# Patient Record
Sex: FEMALE | Race: WHITE | NOT HISPANIC OR LATINO | Employment: FULL TIME | URBAN - METROPOLITAN AREA
[De-identification: names, ages, dates, MRNs, and addresses within clinical notes are randomized per-mention and may not be internally consistent; named-entity substitution may affect disease eponyms.]

---

## 2019-10-21 ENCOUNTER — TELEPHONE (OUTPATIENT)
Dept: MATERNAL FETAL MEDICINE | Facility: CLINIC | Age: 29
End: 2019-10-21

## 2019-10-21 DIAGNOSIS — D26.7: Primary | ICD-10-CM

## 2019-10-21 DIAGNOSIS — O43.199: Primary | ICD-10-CM

## 2019-10-23 ENCOUNTER — OFFICE VISIT (OUTPATIENT)
Dept: MATERNAL FETAL MEDICINE | Facility: CLINIC | Age: 29
End: 2019-10-23
Payer: COMMERCIAL

## 2019-10-23 VITALS
SYSTOLIC BLOOD PRESSURE: 123 MMHG | BODY MASS INDEX: 23.7 KG/M2 | WEIGHT: 151 LBS | HEIGHT: 67 IN | DIASTOLIC BLOOD PRESSURE: 58 MMHG

## 2019-10-23 DIAGNOSIS — O43.199: ICD-10-CM

## 2019-10-23 DIAGNOSIS — Z92.89: ICD-10-CM

## 2019-10-23 DIAGNOSIS — D26.7 PLACENTA CHORIOANGIOMA IN SECOND TRIMESTER: ICD-10-CM

## 2019-10-23 DIAGNOSIS — Z36.89 ENCOUNTER FOR ULTRASOUND TO ASSESS FETAL GROWTH: Primary | ICD-10-CM

## 2019-10-23 DIAGNOSIS — D26.7: ICD-10-CM

## 2019-10-23 DIAGNOSIS — O43.192 PLACENTA CHORIOANGIOMA IN SECOND TRIMESTER: ICD-10-CM

## 2019-10-23 PROCEDURE — 3008F BODY MASS INDEX DOCD: CPT | Mod: CPTII,,, | Performed by: OBSTETRICS & GYNECOLOGY

## 2019-10-23 PROCEDURE — 76811 OB US DETAILED SNGL FETUS: CPT | Mod: ,,, | Performed by: OBSTETRICS & GYNECOLOGY

## 2019-10-23 PROCEDURE — 3008F PR BODY MASS INDEX (BMI) DOCUMENTED: ICD-10-PCS | Mod: CPTII,,, | Performed by: OBSTETRICS & GYNECOLOGY

## 2019-10-23 PROCEDURE — 99203 PR OFFICE/OUTPT VISIT, NEW, LEVL III, 30-44 MIN: ICD-10-PCS | Mod: 25,,, | Performed by: OBSTETRICS & GYNECOLOGY

## 2019-10-23 PROCEDURE — 76811 PR US, OB FETAL EVAL & EXAM, TRANSABDOM,FIRST GESTATION: ICD-10-PCS | Mod: ,,, | Performed by: OBSTETRICS & GYNECOLOGY

## 2019-10-23 PROCEDURE — 99203 OFFICE O/P NEW LOW 30 MIN: CPT | Mod: 25,,, | Performed by: OBSTETRICS & GYNECOLOGY

## 2019-10-23 RX ORDER — OMEPRAZOLE 20 MG/1
20 TABLET, DELAYED RELEASE ORAL DAILY
COMMUNITY

## 2019-10-23 RX ORDER — CITALOPRAM 20 MG/1
TABLET, FILM COATED ORAL
Refills: 5 | COMMUNITY
Start: 2019-10-16

## 2019-10-23 RX ORDER — ONDANSETRON HYDROCHLORIDE 8 MG/1
TABLET, FILM COATED ORAL
Refills: 2 | COMMUNITY
Start: 2019-10-03

## 2019-10-23 NOTE — LETTER
October 23, 2019      Shyam Juarez MD  [address] [place] [address] [place] [address]           [facility]  [address] [facility] , [address]  [place] [address]  Phone: [phone]          Patient: Kalli Osorio   MR Number: [id number]   YOB: 1990   Date of Visit: 10/23/2019       Dear Dr. Shyam Juarez:    Thank you for referring Kalli Osorio to me for evaluation. Attached you will find relevant portions of my assessment and plan of care.    If you have questions, please do not hesitate to call me. I look forward to following Kalli Osorio along with you.    Sincerely,    Leonardo Olson MD    Enclosure  CC:  No Recipients    If you would like to receive this communication electronically, please contact [email or web address] or [phone] to request more information on [facility] [employer] access.    For providers and/or their staff who would like to refer a patient to [facility], please contact us through our one-stop-shop provider referral line, [facility], at [phone].    If you feel you have received this communication in error or would no longer like to receive these types of communications, please e-mail [email or web address]

## 2019-10-23 NOTE — PROGRESS NOTES
Chief complaint: Placental tumor, history bowel intussesception    Provider requesting consultation: Dr. Camarillo    29 y.o. [id number] 22w1d EGA     PMH:  Past Medical History:   Diagnosis Date    Acid reflux     Hyperemesis gravidarum     Kidney stones     SAB (spontaneous )     Ulcer        PObHx:  OB History    Para Term  AB Living   3 1 1   1 1   SAB TAB Ectopic Multiple Live Births   1       1      # Outcome Date GA Lbr Henry/2nd Weight Sex Delivery Anes PTL Lv   3 Current            2 Term 11/10/17 40w0d  3.487 kg (7 lb 11 oz) F CS-LTranv   ANIBAL      Complications: Hyperemesis gravidarum   1 SAB 2016               PSH:  Past Surgical History:   Procedure Laterality Date    ADENOIDECTOMY      APPENDECTOMY      CHOLECYSTECTOMY      DILATION AND CURETTAGE OF UTERUS      KIDNEY STONE SURGERY      LITHOTRIPSY      TONSILLECTOMY         Family history:family history is not on file.    Social history: reports that she quit smoking about 3 years ago. Her smoking use included cigarettes. She has a 3.50 pack-year smoking history. She has never used smokeless tobacco. She reports that she drank alcohol. She reports that she does not use drugs.    A detailed fetal anatomical ultrasound was completed today.  [facility] details [facility] imaging section of [facility].    The patient is referred for the finding of what appears to be a tumor on the placenta. On today's U/S there is a inhomogeneous solid/cystic mass that is well circumscribed.  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  Chorangiomas range from microscopic lesions to large masses. Those greater than 4 to 5 cm in diameter carry a risk of fetal morbidity due to high output heart failure from arteriovenous shunting, platelet trapping (consumptive coagulopathy), and  delivery. Polyhydramnios is also common. On today's U/S the mass  measures 2.6 x 2.6 x 2.6 cm.  It is retroplacental in position.  It is possible that this represents a fibroid with degeneration.      In addition, note is made of the patient's past GI history.  She has had significant bowel problems with a non surgically treated intussusception.  Her symptoms are apparently made worse by the smooth muscle slowing effects of the progesterone secretion in pregnancy.  I am not in possession of any of these records at the time of this consult.  I will request records for her next visit.      I would like to see her back in 3 weeks to f/u tumor, anatomy and review records.     The patient was given an opportunity to ask questions about management and the diease process.  She expressed an understanding of and agreement to the above impression and plan. All questions were answered to her satisfaction.  She was given contact information to the [facility] clinic to address further concerns.      The approximate physician face-to-face time was 30 minutes. The majority of the time (>50%) was spent on counseling of the patient or coordination of care.

## 2019-11-12 ENCOUNTER — TELEPHONE (OUTPATIENT)
Dept: MATERNAL FETAL MEDICINE | Facility: CLINIC | Age: 29
End: 2019-11-12

## 2019-11-13 ENCOUNTER — PROCEDURE VISIT (OUTPATIENT)
Dept: MATERNAL FETAL MEDICINE | Facility: CLINIC | Age: 29
End: 2019-11-13
Payer: MEDICAID

## 2019-11-13 VITALS — DIASTOLIC BLOOD PRESSURE: 55 MMHG | SYSTOLIC BLOOD PRESSURE: 117 MMHG | WEIGHT: 158 LBS | BODY MASS INDEX: 24.75 KG/M2

## 2019-11-13 DIAGNOSIS — D26.7: ICD-10-CM

## 2019-11-13 DIAGNOSIS — O43.199: ICD-10-CM

## 2019-11-13 DIAGNOSIS — Z36.89 ENCOUNTER FOR ULTRASOUND TO ASSESS FETAL GROWTH: ICD-10-CM

## 2019-11-13 PROCEDURE — 76816 OB US FOLLOW-UP PER FETUS: CPT | Mod: ,,, | Performed by: OBSTETRICS & GYNECOLOGY

## 2019-11-13 PROCEDURE — 76816 PR  US,PREGNANT UTERUS,F/U,TRANSABD APP: ICD-10-PCS | Mod: ,,, | Performed by: OBSTETRICS & GYNECOLOGY
